# Patient Record
Sex: MALE | Race: WHITE
[De-identification: names, ages, dates, MRNs, and addresses within clinical notes are randomized per-mention and may not be internally consistent; named-entity substitution may affect disease eponyms.]

---

## 2022-12-16 ENCOUNTER — HOSPITAL ENCOUNTER (INPATIENT)
Dept: HOSPITAL 95 - ICUE | Age: 67
LOS: 5 days | Discharge: HOME | DRG: 871 | End: 2022-12-21
Attending: HOSPITALIST | Admitting: HOSPITALIST
Payer: COMMERCIAL

## 2022-12-16 VITALS — BODY MASS INDEX: 23.14 KG/M2 | HEIGHT: 74 IN | WEIGHT: 180.34 LBS

## 2022-12-16 DIAGNOSIS — N28.89: ICD-10-CM

## 2022-12-16 DIAGNOSIS — Z98.890: ICD-10-CM

## 2022-12-16 DIAGNOSIS — Z88.7: ICD-10-CM

## 2022-12-16 DIAGNOSIS — Z79.891: ICD-10-CM

## 2022-12-16 DIAGNOSIS — N28.1: ICD-10-CM

## 2022-12-16 DIAGNOSIS — F17.210: ICD-10-CM

## 2022-12-16 DIAGNOSIS — K11.21: ICD-10-CM

## 2022-12-16 DIAGNOSIS — Z87.19: ICD-10-CM

## 2022-12-16 DIAGNOSIS — J10.1: ICD-10-CM

## 2022-12-16 DIAGNOSIS — E11.00: ICD-10-CM

## 2022-12-16 DIAGNOSIS — Z79.51: ICD-10-CM

## 2022-12-16 DIAGNOSIS — J44.9: ICD-10-CM

## 2022-12-16 DIAGNOSIS — I10: ICD-10-CM

## 2022-12-16 DIAGNOSIS — E78.5: ICD-10-CM

## 2022-12-16 DIAGNOSIS — E11.51: ICD-10-CM

## 2022-12-16 DIAGNOSIS — G47.33: ICD-10-CM

## 2022-12-16 DIAGNOSIS — H40.9: ICD-10-CM

## 2022-12-16 DIAGNOSIS — Z79.02: ICD-10-CM

## 2022-12-16 DIAGNOSIS — Z90.49: ICD-10-CM

## 2022-12-16 DIAGNOSIS — Z79.82: ICD-10-CM

## 2022-12-16 DIAGNOSIS — F32.A: ICD-10-CM

## 2022-12-16 DIAGNOSIS — Z79.84: ICD-10-CM

## 2022-12-16 DIAGNOSIS — Z88.8: ICD-10-CM

## 2022-12-16 DIAGNOSIS — Z79.4: ICD-10-CM

## 2022-12-16 DIAGNOSIS — E11.42: ICD-10-CM

## 2022-12-16 DIAGNOSIS — Z93.0: ICD-10-CM

## 2022-12-16 DIAGNOSIS — L03.211: ICD-10-CM

## 2022-12-16 DIAGNOSIS — H54.7: ICD-10-CM

## 2022-12-16 DIAGNOSIS — R59.0: ICD-10-CM

## 2022-12-16 DIAGNOSIS — Z88.5: ICD-10-CM

## 2022-12-16 DIAGNOSIS — H18.519: ICD-10-CM

## 2022-12-16 DIAGNOSIS — F10.10: ICD-10-CM

## 2022-12-16 DIAGNOSIS — Z79.899: ICD-10-CM

## 2022-12-16 DIAGNOSIS — A41.9: Primary | ICD-10-CM

## 2022-12-16 DIAGNOSIS — G89.29: ICD-10-CM

## 2022-12-16 LAB
ANION GAP SERPL CALCULATED.4IONS-SCNC: 6 MMOL/L (ref 6–16)
BASOPHILS # BLD AUTO: 0.08 K/MM3 (ref 0–0.23)
BASOPHILS NFR BLD AUTO: 0 % (ref 0–2)
BUN SERPL-MCNC: 26 MG/DL (ref 8–24)
CALCIUM SERPL-MCNC: 9 MG/DL (ref 8.5–10.1)
CHLORIDE SERPL-SCNC: 107 MMOL/L (ref 98–108)
CO2 SERPL-SCNC: 27 MMOL/L (ref 21–32)
CREAT SERPL-MCNC: 0.59 MG/DL (ref 0.6–1.2)
DEPRECATED RDW RBC AUTO: 37.3 FL (ref 35.1–46.3)
EOSINOPHIL # BLD AUTO: 0.11 K/MM3 (ref 0–0.68)
EOSINOPHIL NFR BLD AUTO: 1 % (ref 0–6)
ERYTHROCYTE [DISTWIDTH] IN BLOOD BY AUTOMATED COUNT: 12.3 % (ref 11.7–14.2)
GLUCOSE SERPL-MCNC: 308 MG/DL (ref 70–99)
HCT VFR BLD AUTO: 36 % (ref 37–53)
HGB BLD-MCNC: 13 G/DL (ref 13.5–17.5)
IMM GRANULOCYTES # BLD AUTO: 0.08 K/MM3 (ref 0–0.1)
IMM GRANULOCYTES NFR BLD AUTO: 0 % (ref 0–1)
LYMPHOCYTES # BLD AUTO: 5.46 K/MM3 (ref 0.84–5.2)
LYMPHOCYTES NFR BLD AUTO: 26 % (ref 21–46)
MCHC RBC AUTO-ENTMCNC: 36.1 G/DL (ref 31.5–36.5)
MCV RBC AUTO: 83 FL (ref 80–100)
MONOCYTES # BLD AUTO: 1.14 K/MM3 (ref 0.16–1.47)
MONOCYTES NFR BLD AUTO: 5 % (ref 4–13)
NEUTROPHILS # BLD AUTO: 14.38 K/MM3 (ref 1.96–9.15)
NEUTROPHILS NFR BLD AUTO: 68 % (ref 41–73)
NRBC # BLD AUTO: 0 K/MM3 (ref 0–0.02)
NRBC BLD AUTO-RTO: 0 /100 WBC (ref 0–0.2)
PLATELET # BLD AUTO: 518 K/MM3 (ref 150–400)
POTASSIUM SERPL-SCNC: 3.4 MMOL/L (ref 3.5–5.5)
SODIUM SERPL-SCNC: 140 MMOL/L (ref 136–145)

## 2022-12-16 PROCEDURE — C1751 CATH, INF, PER/CENT/MIDLINE: HCPCS

## 2022-12-16 PROCEDURE — A9270 NON-COVERED ITEM OR SERVICE: HCPCS

## 2022-12-17 LAB
ANION GAP SERPL CALCULATED.4IONS-SCNC: 5 MMOL/L (ref 6–16)
ANION GAP SERPL CALCULATED.4IONS-SCNC: 6 MMOL/L (ref 6–16)
ANION GAP SERPL CALCULATED.4IONS-SCNC: 6 MMOL/L (ref 6–16)
ANION GAP SERPL CALCULATED.4IONS-SCNC: 7 MMOL/L (ref 6–16)
BASOPHILS # BLD AUTO: 0.08 K/MM3 (ref 0–0.23)
BASOPHILS NFR BLD AUTO: 0 % (ref 0–2)
BUN SERPL-MCNC: 18 MG/DL (ref 8–24)
BUN SERPL-MCNC: 19 MG/DL (ref 8–24)
BUN SERPL-MCNC: 19 MG/DL (ref 8–24)
BUN SERPL-MCNC: 22 MG/DL (ref 8–24)
CALCIUM SERPL-MCNC: 8.4 MG/DL (ref 8.5–10.1)
CALCIUM SERPL-MCNC: 8.4 MG/DL (ref 8.5–10.1)
CALCIUM SERPL-MCNC: 8.6 MG/DL (ref 8.5–10.1)
CALCIUM SERPL-MCNC: 8.7 MG/DL (ref 8.5–10.1)
CHLORIDE SERPL-SCNC: 107 MMOL/L (ref 98–108)
CHLORIDE SERPL-SCNC: 108 MMOL/L (ref 98–108)
CHLORIDE SERPL-SCNC: 109 MMOL/L (ref 98–108)
CHLORIDE SERPL-SCNC: 110 MMOL/L (ref 98–108)
CO2 SERPL-SCNC: 24 MMOL/L (ref 21–32)
CREAT SERPL-MCNC: 0.5 MG/DL (ref 0.6–1.2)
CREAT SERPL-MCNC: 0.55 MG/DL (ref 0.6–1.2)
CREAT SERPL-MCNC: 0.58 MG/DL (ref 0.6–1.2)
CREAT SERPL-MCNC: 0.58 MG/DL (ref 0.6–1.2)
DEPRECATED RDW RBC AUTO: 36.7 FL (ref 35.1–46.3)
EOSINOPHIL # BLD AUTO: 0.05 K/MM3 (ref 0–0.68)
EOSINOPHIL NFR BLD AUTO: 0 % (ref 0–6)
ERYTHROCYTE [DISTWIDTH] IN BLOOD BY AUTOMATED COUNT: 12.4 % (ref 11.7–14.2)
GLUCOSE SERPL-MCNC: 226 MG/DL (ref 70–99)
GLUCOSE SERPL-MCNC: 257 MG/DL (ref 70–99)
GLUCOSE SERPL-MCNC: 268 MG/DL (ref 70–99)
GLUCOSE SERPL-MCNC: 269 MG/DL (ref 70–99)
HCT VFR BLD AUTO: 34.5 % (ref 37–53)
HGB BLD-MCNC: 12.6 G/DL (ref 13.5–17.5)
IMM GRANULOCYTES # BLD AUTO: 0.12 K/MM3 (ref 0–0.1)
IMM GRANULOCYTES NFR BLD AUTO: 1 % (ref 0–1)
LYMPHOCYTES # BLD AUTO: 4.83 K/MM3 (ref 0.84–5.2)
LYMPHOCYTES NFR BLD AUTO: 21 % (ref 21–46)
MCHC RBC AUTO-ENTMCNC: 36.5 G/DL (ref 31.5–36.5)
MCV RBC AUTO: 82 FL (ref 80–100)
MONOCYTES # BLD AUTO: 1.09 K/MM3 (ref 0.16–1.47)
MONOCYTES NFR BLD AUTO: 5 % (ref 4–13)
NEUTROPHILS # BLD AUTO: 16.84 K/MM3 (ref 1.96–9.15)
NEUTROPHILS NFR BLD AUTO: 73 % (ref 41–73)
NRBC # BLD AUTO: 0 K/MM3 (ref 0–0.02)
NRBC BLD AUTO-RTO: 0 /100 WBC (ref 0–0.2)
PLATELET # BLD AUTO: 524 K/MM3 (ref 150–400)
POTASSIUM SERPL-SCNC: 3.5 MMOL/L (ref 3.5–5.5)
SODIUM SERPL-SCNC: 137 MMOL/L (ref 136–145)
SODIUM SERPL-SCNC: 138 MMOL/L (ref 136–145)
SODIUM SERPL-SCNC: 138 MMOL/L (ref 136–145)
SODIUM SERPL-SCNC: 141 MMOL/L (ref 136–145)
TSH SERPL DL<=0.005 MIU/L-ACNC: 0.91 UIU/ML (ref 0.36–4.8)

## 2022-12-17 NOTE — NUR
ASSUMED CARE
PT IS A&O X4. SPO2 >92% ON RA; MAP >65. NO C/O OF SOB OR NAUSEA. PAIN IN RIGHT
JAW, SHARP/LOCALIZED PAIN IN CHEST FROM "DIVOT". PT STATES THAT IT STARTED
YESTERDAY, BUT THINKS IT'S FROM AN OLD INJURY. PT ALSO STATES THAT HIS LOWER
LEFT QUADRANT IS SENSITIVE UPON PALPATION D/T OLD INJURY; CHRONIC PAIN PER PT;
HYPOACTIVE BOWEL TONES.

## 2022-12-17 NOTE — NUR
SHIFT SUMMARY
PT IS A&O X4. SPO2 >92% ON RA. MAP >65. PT MADE MEDICAL STATUS AND HAD
PRESSURES IN -130'S THIS AM, WHEN CHECKING AFTERNOON PRESSURES PT WAS
HYPERTENSIVE AND GIVEN 20MG HYDRALAZINE PER ORDER. PT STILL HAS C/O OF JAW
PAIN. PT ALSO MENTIONS HAVING PAIN IN HIS RIGHT EYE WHICH HE BELIEVES IS
STEMMING FRMO THE JAW PAIN. PT AMBULATING WELL.

## 2022-12-17 NOTE — NUR
SHIFT SUMMARY: TRANSFER ORDERS FOR PCU.
INSULIN DRIP HAS BEEN OFF SINCE ARRIVAL PER
DR. BOLAÑOS. BLOOD SUGARS STABILIZES -300 RANGE. ACHS SSI STARTED AS WELL
AS LONG ACTING INSULIN. PT HAS SEVERE RIGHT SIDED JAW PAIN AND SWELLING, PRN
FENTANYL GIVEN. PT RECEIVED 1L FLUIDS SINCE ARRIVING.

## 2022-12-18 LAB
ALBUMIN SERPL BCP-MCNC: 2.3 G/DL (ref 3.4–5)
ALBUMIN/GLOB SERPL: 0.5 {RATIO} (ref 0.8–1.8)
ALT SERPL W P-5'-P-CCNC: 12 U/L (ref 12–78)
ANION GAP SERPL CALCULATED.4IONS-SCNC: 5 MMOL/L (ref 6–16)
AST SERPL W P-5'-P-CCNC: 12 U/L (ref 12–37)
BASOPHILS # BLD AUTO: 0.07 K/MM3 (ref 0–0.23)
BASOPHILS NFR BLD AUTO: 0 % (ref 0–2)
BILIRUB SERPL-MCNC: 0.4 MG/DL (ref 0.1–1)
BUN SERPL-MCNC: 19 MG/DL (ref 8–24)
CALCIUM SERPL-MCNC: 8.5 MG/DL (ref 8.5–10.1)
CHLORIDE SERPL-SCNC: 109 MMOL/L (ref 98–108)
CO2 SERPL-SCNC: 25 MMOL/L (ref 21–32)
CREAT SERPL-MCNC: 0.6 MG/DL (ref 0.6–1.2)
DEPRECATED RDW RBC AUTO: 39.9 FL (ref 35.1–46.3)
EOSINOPHIL # BLD AUTO: 0.16 K/MM3 (ref 0–0.68)
EOSINOPHIL NFR BLD AUTO: 1 % (ref 0–6)
ERYTHROCYTE [DISTWIDTH] IN BLOOD BY AUTOMATED COUNT: 13.1 % (ref 11.7–14.2)
GLOBULIN SER CALC-MCNC: 4.2 G/DL (ref 2.2–4)
GLUCOSE SERPL-MCNC: 288 MG/DL (ref 70–99)
HCT VFR BLD AUTO: 35.7 % (ref 37–53)
HGB BLD-MCNC: 12.6 G/DL (ref 13.5–17.5)
IMM GRANULOCYTES # BLD AUTO: 0.1 K/MM3 (ref 0–0.1)
IMM GRANULOCYTES NFR BLD AUTO: 1 % (ref 0–1)
LYMPHOCYTES # BLD AUTO: 5.47 K/MM3 (ref 0.84–5.2)
LYMPHOCYTES NFR BLD AUTO: 25 % (ref 21–46)
MCHC RBC AUTO-ENTMCNC: 35.3 G/DL (ref 31.5–36.5)
MCV RBC AUTO: 85 FL (ref 80–100)
MONOCYTES # BLD AUTO: 0.98 K/MM3 (ref 0.16–1.47)
MONOCYTES NFR BLD AUTO: 5 % (ref 4–13)
NEUTROPHILS # BLD AUTO: 14.88 K/MM3 (ref 1.96–9.15)
NEUTROPHILS NFR BLD AUTO: 69 % (ref 41–73)
NRBC # BLD AUTO: 0 K/MM3 (ref 0–0.02)
NRBC BLD AUTO-RTO: 0 /100 WBC (ref 0–0.2)
PLATELET # BLD AUTO: 518 K/MM3 (ref 150–400)
POTASSIUM SERPL-SCNC: 3.8 MMOL/L (ref 3.5–5.5)
PROT SERPL-MCNC: 6.5 G/DL (ref 6.4–8.2)
SODIUM SERPL-SCNC: 139 MMOL/L (ref 136–145)

## 2022-12-18 NOTE — NUR
SHIFT SUMMARY
 
PATIENT IS ALERT AND ORIENTED. PATIENT JUST TRANSFERRED FROM ICU6 TO [facility].
PATIENT ORIENTED TO ROOM. PATIENT HAS BEEN IND SO FAR THIS SHIFT. WILL MONITOR
AND TRANSFER ADDITIONAL INFORMATION TO NOC SHIFT. VITAL SIGNS REVIEWED.
PATIENT HAS NO COMPLAINTS OF PAIN, NAUSEA, SOB OR VOMITTING THIS SHIFT.

## 2022-12-18 NOTE — NUR
Shift Summary:
 
Neuro: A&Ox4. C/O pain to right side of face - relieved with PRN Tramadol and
Toradol and still swollen. Rt eye is hazy from previous surgery and pt is
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
Cardiac: SR with HR in 60-70s. BP slightly elevated at times, but consistent
when pt is in pain. Treated pain and BP lowered on its own.
Resp: Clear, RA.
GI/: ADA diet. No BM overnight. Voids per urinal ~150-300 at a time with
clear yellow urine.

## 2022-12-18 NOTE — NUR
TRANSFER TO SURGICAL FLOOR
REPORT CALLED, ALL QUESTIONS ANSWERED. PT TAKEN TO ROOM 208 VIA WHEELCHAIR
WITH CNA. ALL PT BELONGINGS AND MEDS TAKEN WITH PT.

## 2022-12-19 LAB
ALBUMIN SERPL BCP-MCNC: 2.4 G/DL (ref 3.4–5)
ALBUMIN/GLOB SERPL: 0.6 {RATIO} (ref 0.8–1.8)
ALT SERPL W P-5'-P-CCNC: 15 U/L (ref 12–78)
ANION GAP SERPL CALCULATED.4IONS-SCNC: 6 MMOL/L (ref 6–16)
AST SERPL W P-5'-P-CCNC: 15 U/L (ref 12–37)
BASOPHILS # BLD AUTO: 0.1 K/MM3 (ref 0–0.23)
BASOPHILS NFR BLD AUTO: 1 % (ref 0–2)
BILIRUB SERPL-MCNC: 0.3 MG/DL (ref 0.1–1)
BUN SERPL-MCNC: 19 MG/DL (ref 8–24)
CALCIUM SERPL-MCNC: 9 MG/DL (ref 8.5–10.1)
CHLORIDE SERPL-SCNC: 108 MMOL/L (ref 98–108)
CO2 SERPL-SCNC: 26 MMOL/L (ref 21–32)
CREAT SERPL-MCNC: 0.6 MG/DL (ref 0.6–1.2)
DEPRECATED RDW RBC AUTO: 40.7 FL (ref 35.1–46.3)
EOSINOPHIL # BLD AUTO: 0.29 K/MM3 (ref 0–0.68)
EOSINOPHIL NFR BLD AUTO: 2 % (ref 0–6)
ERYTHROCYTE [DISTWIDTH] IN BLOOD BY AUTOMATED COUNT: 13.1 % (ref 11.7–14.2)
GLOBULIN SER CALC-MCNC: 4.1 G/DL (ref 2.2–4)
GLUCOSE SERPL-MCNC: 245 MG/DL (ref 70–99)
HCT VFR BLD AUTO: 34.1 % (ref 37–53)
HGB BLD-MCNC: 12 G/DL (ref 13.5–17.5)
IMM GRANULOCYTES # BLD AUTO: 0.08 K/MM3 (ref 0–0.1)
IMM GRANULOCYTES NFR BLD AUTO: 1 % (ref 0–1)
LYMPHOCYTES # BLD AUTO: 5.47 K/MM3 (ref 0.84–5.2)
LYMPHOCYTES NFR BLD AUTO: 34 % (ref 21–46)
MCHC RBC AUTO-ENTMCNC: 35.2 G/DL (ref 31.5–36.5)
MCV RBC AUTO: 85 FL (ref 80–100)
MONOCYTES # BLD AUTO: 0.87 K/MM3 (ref 0.16–1.47)
MONOCYTES NFR BLD AUTO: 5 % (ref 4–13)
NEUTROPHILS # BLD AUTO: 9.35 K/MM3 (ref 1.96–9.15)
NEUTROPHILS NFR BLD AUTO: 58 % (ref 41–73)
NRBC # BLD AUTO: 0 K/MM3 (ref 0–0.02)
NRBC BLD AUTO-RTO: 0 /100 WBC (ref 0–0.2)
PLATELET # BLD AUTO: 498 K/MM3 (ref 150–400)
POTASSIUM SERPL-SCNC: 3.4 MMOL/L (ref 3.5–5.5)
PROT SERPL-MCNC: 6.5 G/DL (ref 6.4–8.2)
SODIUM SERPL-SCNC: 140 MMOL/L (ref 136–145)

## 2022-12-19 NOTE — NUR
SUMMARY
NO ACUTE CHANGES T/O SHIFT.  PT CONTINUES TO HAVE EDEMA, FIRMNESS TO R FACE
DUE TO PAROTINIS.  RECEIVING ABX PER ORDERS.  PT HAS DENIED NEED FOR PAIN MEDS
SO FAR THIS SHIFT.  CALL LIGHT IN REACH.

## 2022-12-19 NOTE — NUR
cbg 111
PT ATE 75% OF DINNER.  ADMINISTERED 8 UNITS MEAL COVERAGE PER ORDERS.  NO SS
COVERAGE PER ORDERS.

## 2022-12-19 NOTE — NUR
SHIFT SUMMARY
 
PATIENT IS TRANSFER FROM ICU YESTERDAY EVENING. HHS IS RESOLVED BLOOD SUGAR IS
239 BEFORE BED NO SLIDING SCALE NEEDED. R SIDE JAW FACE SWELLING PER PT THE
SWELLING LOOKS BETTER. MEDICATED WITH TORADOL T/O SHIFT. ON BROAD SPECTRUM ABX
PENDING CX RESULTS. PLAN FOR DC TODAY IF PATIENT IF MEDICALLY STABLE. VOIDING
EASILY, VSS, CALL LIGHT IN REACH.

## 2022-12-20 LAB
ALBUMIN SERPL BCP-MCNC: 2.4 G/DL (ref 3.4–5)
ALBUMIN/GLOB SERPL: 0.6 {RATIO} (ref 0.8–1.8)
ALT SERPL W P-5'-P-CCNC: 14 U/L (ref 12–78)
ANION GAP SERPL CALCULATED.4IONS-SCNC: 5 MMOL/L (ref 6–16)
AST SERPL W P-5'-P-CCNC: 15 U/L (ref 12–37)
BASOPHILS # BLD AUTO: 0.08 K/MM3 (ref 0–0.23)
BASOPHILS NFR BLD AUTO: 1 % (ref 0–2)
BILIRUB SERPL-MCNC: 0.3 MG/DL (ref 0.1–1)
BUN SERPL-MCNC: 25 MG/DL (ref 8–24)
CALCIUM SERPL-MCNC: 8.7 MG/DL (ref 8.5–10.1)
CHLORIDE SERPL-SCNC: 107 MMOL/L (ref 98–108)
CO2 SERPL-SCNC: 27 MMOL/L (ref 21–32)
CREAT SERPL-MCNC: 0.76 MG/DL (ref 0.6–1.2)
DEPRECATED RDW RBC AUTO: 41.3 FL (ref 35.1–46.3)
EOSINOPHIL # BLD AUTO: 0.18 K/MM3 (ref 0–0.68)
EOSINOPHIL NFR BLD AUTO: 1 % (ref 0–6)
ERYTHROCYTE [DISTWIDTH] IN BLOOD BY AUTOMATED COUNT: 13.3 % (ref 11.7–14.2)
GLOBULIN SER CALC-MCNC: 4.2 G/DL (ref 2.2–4)
GLUCOSE SERPL-MCNC: 332 MG/DL (ref 70–99)
HCT VFR BLD AUTO: 36.1 % (ref 37–53)
HGB BLD-MCNC: 12.3 G/DL (ref 13.5–17.5)
IMM GRANULOCYTES # BLD AUTO: 0.06 K/MM3 (ref 0–0.1)
IMM GRANULOCYTES NFR BLD AUTO: 0 % (ref 0–1)
LYMPHOCYTES # BLD AUTO: 4.16 K/MM3 (ref 0.84–5.2)
LYMPHOCYTES NFR BLD AUTO: 30 % (ref 21–46)
MCHC RBC AUTO-ENTMCNC: 34.1 G/DL (ref 31.5–36.5)
MCV RBC AUTO: 85 FL (ref 80–100)
MONOCYTES # BLD AUTO: 0.74 K/MM3 (ref 0.16–1.47)
MONOCYTES NFR BLD AUTO: 5 % (ref 4–13)
NEUTROPHILS # BLD AUTO: 8.83 K/MM3 (ref 1.96–9.15)
NEUTROPHILS NFR BLD AUTO: 63 % (ref 41–73)
NRBC # BLD AUTO: 0 K/MM3 (ref 0–0.02)
NRBC BLD AUTO-RTO: 0 /100 WBC (ref 0–0.2)
PLATELET # BLD AUTO: 534 K/MM3 (ref 150–400)
POTASSIUM SERPL-SCNC: 3.9 MMOL/L (ref 3.5–5.5)
PROT SERPL-MCNC: 6.6 G/DL (ref 6.4–8.2)
SODIUM SERPL-SCNC: 139 MMOL/L (ref 136–145)

## 2022-12-20 NOTE — NUR
SHIFT SUMMARY
PATIENT ALERT AND ORIENTED THROUGHOUT SHIFT. TOLERATING REGULAR DIET AND
LIQUIDS. ROUTINE IV ABX. SBA FROM BED TO CHAIR. RIGHT SIDE OF FACE MILDLY
SWOLLEN AND PAINFUL. RIGHT EYE IS BLIND AT BASELINE. MEDICATED FOR PAIN PRN.
CHEM BGS COVERED WITH INSULIN. PLAN TO DISCHARGE HOME TOMORROW 12/21/22 ON
ORAL ABX.

## 2022-12-20 NOTE — NUR
SHIFT SUMMARY
 
NO ACUTE CHANGES THIS SHIFT. PT HAS DECLINED THE NEED FOR PAIN MEDS THIS
SHIFT. REPORTS RIGHT SIDED FACIAL PAIN IS TOLERABLE. IV ABX PER ORDERS. 1 SBA
TO RESTROOM. PT HAS RESTED WELL T/O NIGHT. USES CALL LIGHT APPROPRIATELY.

## 2022-12-21 LAB
BASOPHILS # BLD AUTO: 0.1 K/MM3 (ref 0–0.23)
BASOPHILS NFR BLD AUTO: 1 % (ref 0–2)
DEPRECATED RDW RBC AUTO: 41.2 FL (ref 35.1–46.3)
EOSINOPHIL # BLD AUTO: 0.21 K/MM3 (ref 0–0.68)
EOSINOPHIL NFR BLD AUTO: 2 % (ref 0–6)
ERYTHROCYTE [DISTWIDTH] IN BLOOD BY AUTOMATED COUNT: 13.3 % (ref 11.7–14.2)
HCT VFR BLD AUTO: 35.3 % (ref 37–53)
HGB BLD-MCNC: 12.2 G/DL (ref 13.5–17.5)
IMM GRANULOCYTES # BLD AUTO: 0.03 K/MM3 (ref 0–0.1)
IMM GRANULOCYTES NFR BLD AUTO: 0 % (ref 0–1)
LYMPHOCYTES # BLD AUTO: 5.14 K/MM3 (ref 0.84–5.2)
LYMPHOCYTES NFR BLD AUTO: 42 % (ref 21–46)
MCHC RBC AUTO-ENTMCNC: 34.6 G/DL (ref 31.5–36.5)
MCV RBC AUTO: 86 FL (ref 80–100)
MONOCYTES # BLD AUTO: 0.83 K/MM3 (ref 0.16–1.47)
MONOCYTES NFR BLD AUTO: 7 % (ref 4–13)
NEUTROPHILS # BLD AUTO: 5.92 K/MM3 (ref 1.96–9.15)
NEUTROPHILS NFR BLD AUTO: 49 % (ref 41–73)
NRBC # BLD AUTO: 0 K/MM3 (ref 0–0.02)
NRBC BLD AUTO-RTO: 0 /100 WBC (ref 0–0.2)
PLATELET # BLD AUTO: 561 K/MM3 (ref 150–400)

## 2022-12-21 NOTE — NUR
DISCHARGE: PACKET PRINTED AND PT EDUCATED. IV DC'D WNL, TIP INTACT. PT LEFT
UNIT AT ABOUT 1300 VIA WHEELCHAIR WITH ALEXANDER HORTON

## 2022-12-21 NOTE — NUR
SHIFT SUMMARY
 
NO ACUTE CHANGES THIS SHIFT. TORADOL FOR PAIN PRN. CONT TO REPORT RIGHT SIDE
OF FACE BEING TENDER. IV ABX PER ORDERS. RAMONE ADA DIET. USING URINAL TO VOID.
USES CALL LIGHT APPROPRIATELY.